# Patient Record
Sex: FEMALE | Race: WHITE
[De-identification: names, ages, dates, MRNs, and addresses within clinical notes are randomized per-mention and may not be internally consistent; named-entity substitution may affect disease eponyms.]

---

## 2017-01-11 ENCOUNTER — HOSPITAL ENCOUNTER (OUTPATIENT)
Dept: HOSPITAL 53 - M OPP | Age: 52
End: 2017-01-11
Attending: SURGERY
Payer: COMMERCIAL

## 2017-01-11 VITALS — HEIGHT: 65 IN | BODY MASS INDEX: 38.32 KG/M2 | WEIGHT: 230 LBS

## 2017-01-11 VITALS — SYSTOLIC BLOOD PRESSURE: 132 MMHG | DIASTOLIC BLOOD PRESSURE: 7 MMHG

## 2017-01-11 DIAGNOSIS — Z87.442: ICD-10-CM

## 2017-01-11 DIAGNOSIS — Z91.041: ICD-10-CM

## 2017-01-11 DIAGNOSIS — K57.30: ICD-10-CM

## 2017-01-11 DIAGNOSIS — D12.3: ICD-10-CM

## 2017-01-11 DIAGNOSIS — R51: ICD-10-CM

## 2017-01-11 DIAGNOSIS — Z57.8: ICD-10-CM

## 2017-01-11 DIAGNOSIS — F32.9: ICD-10-CM

## 2017-01-11 DIAGNOSIS — R06.83: ICD-10-CM

## 2017-01-11 DIAGNOSIS — Z79.899: ICD-10-CM

## 2017-01-11 DIAGNOSIS — Z12.11: Primary | ICD-10-CM

## 2017-01-11 DIAGNOSIS — I10: ICD-10-CM

## 2017-01-11 SDOH — HEALTH STABILITY - PHYSICAL HEALTH: OCCUPATIONAL EXPOSURE TO OTHER RISK FACTORS: Z57.8

## 2017-01-11 NOTE — ROOR
________________________________________________________________________________

Patient Name: Julita Nevarez          Procedure Date: 1/11/2017 9:26 AM

MRN: [id number]                          Account Number: [id number]

YOB: 1965               Age: 52

[place] [facility] [place]                            Gender: Female

Note Status: Finalized                 

________________________________________________________________________________

 

Procedure:           Colonoscopy

Indications:         Screening for colorectal malignant neoplasm

Providers:           DO Addie Huffman MD:        Eliana Lovell MD

Requesting Provider: 

Medicines:           Propofol per Anesthesia

Complications:       No immediate complications.

________________________________________________________________________________

Procedure:           Pre-Anesthesia Assessment:

                     - Prior to the procedure, a History and Physical was 

                     performed, and patient medications and allergies were 

                     reviewed. The patient is competent. The risks and 

                     benefits of the procedure and the sedation options and 

                     risks were discussed with the patient. All questions were 

                     answered and informed consent was obtained. Patient 

                     identification and proposed procedure were verified by 

                     the physician, the nurse, the anesthesiologist and the 

                     technician in the endoscopy suite. Mental Status 

                     Examination: alert and oriented. Airway Examination: 

                     normal oropharyngeal airway and neck mobility. 

                     Respiratory Examination: clear to auscultation. CV 

                     Examination: normal. Prophylactic Antibiotics: The 

                     patient does not require prophylactic antibiotics. Prior 

                     Anticoagulants: The patient has taken no previous 

                     anticoagulant or antiplatelet agents. ASA Grade 

                     Assessment: II - A patient with mild systemic disease. 

                     After reviewing the risks and benefits, the patient was 

                     deemed in satisfactory condition to undergo the 

                     procedure. The anesthesia plan was to use monitored 

                     anesthesia care (MAC). Immediately prior to 

                     administration of medications, the patient was 

                     re-assessed for adequacy to receive sedatives. The heart 

                     rate, respiratory rate, oxygen saturations, blood 

                     pressure, adequacy of pulmonary ventilation, and response 

                     to care were monitored throughout the procedure. The 

                     physical status of the patient was re-assessed after the 

                     procedure.

                     The Colonoscope was introduced through the anus and 

                     advanced to the cecum, identified by the appendiceal 

                     orifice, ileocecal valve and palpation. The colonoscopy 

                     was performed without difficulty. The patient tolerated 

                     the procedure well.

                                                                                

Findings:

     A 5 mm polyp was found in the transverse colon. The polyp was 

     semi-pedunculated. The polyp was removed with a hot snare. Resection was 

     complete, but the polyp tissue was not retrieved. Estimated blood loss: 

     none.

     Scattered small-mouthed diverticula were found in the entire colon.

                                                                                

Impression:          - One 5 mm polyp in the transverse colon, removed with a 

                     hot snare. Complete resection. Polyp tissue not retrieved.

                     - Diverticulosis in the entire examined colon.

Recommendation:      - Patient has a contact number available for emergencies. 

                     The signs and symptoms of potential delayed complications 

                     were discussed with the patient. Return to normal 

                     activities tomorrow. Written discharge instructions were 

                     provided to the patient.

                     - Repeat colonoscopy in 5 years for surveillance.

                     - Return to my office PRN.

                                                                                

 

_________________

Tad Arellano DO

1/11/2017 9:58:05 AM

This report has been signed electronically.

Number of Addenda: 0

 

Note Initiated On: 1/11/2017 9:26 AM

Estimated Blood Loss:

     Estimated blood loss: none.

## 2019-04-09 ENCOUNTER — HOSPITAL ENCOUNTER (OUTPATIENT)
Dept: HOSPITAL 53 - M LABDRAW1 | Age: 54
End: 2019-04-09
Attending: FAMILY MEDICINE
Payer: COMMERCIAL

## 2019-04-09 DIAGNOSIS — E55.9: ICD-10-CM

## 2019-04-09 DIAGNOSIS — E66.9: Primary | ICD-10-CM

## 2019-04-09 LAB
25(OH)D3 SERPL-MCNC: 13 NG/ML (ref 30–100)
BUN SERPL-MCNC: 11 MG/DL (ref 7–18)
CALCIUM SERPL-MCNC: 9 MG/DL (ref 8.5–10.1)
CHLORIDE SERPL-SCNC: 107 MEQ/L (ref 98–107)
CHOLEST SERPL-MCNC: 196 MG/DL (ref ?–200)
CHOLEST/HDLC SERPL: 3.21 {RATIO} (ref ?–5)
CO2 SERPL-SCNC: 30 MEQ/L (ref 21–32)
CREAT SERPL-MCNC: 0.7 MG/DL (ref 0.55–1.3)
GFR SERPL CREATININE-BSD FRML MDRD: > 60 ML/MIN/{1.73_M2} (ref 51–?)
GLUCOSE SERPL-MCNC: 85 MG/DL (ref 70–100)
HDLC SERPL-MCNC: 61 MG/DL (ref 40–?)
LDLC SERPL CALC-MCNC: 120 MG/DL (ref ?–100)
NONHDLC SERPL-MCNC: 135 MG/DL
POTASSIUM SERPL-SCNC: 4.5 MEQ/L (ref 3.5–5.1)
SODIUM SERPL-SCNC: 141 MEQ/L (ref 136–145)
TRIGL SERPL-MCNC: 77 MG/DL (ref ?–150)

## 2019-12-09 ENCOUNTER — HOSPITAL ENCOUNTER (OUTPATIENT)
Dept: HOSPITAL 53 - M LABDRAW1 | Age: 54
End: 2019-12-09
Attending: PHYSICIAN ASSISTANT
Payer: COMMERCIAL

## 2019-12-09 DIAGNOSIS — Z11.59: ICD-10-CM

## 2019-12-09 DIAGNOSIS — I10: Primary | ICD-10-CM

## 2019-12-09 DIAGNOSIS — E55.9: ICD-10-CM

## 2019-12-09 DIAGNOSIS — J39.0: ICD-10-CM

## 2019-12-09 LAB
25(OH)D3 SERPL-MCNC: 48 NG/ML (ref 30–100)
ALBUMIN SERPL BCG-MCNC: 3.5 GM/DL (ref 3.2–5.2)
ALT SERPL W P-5'-P-CCNC: 77 U/L (ref 12–78)
BASOPHILS # BLD AUTO: 0.1 10^3/UL (ref 0–0.2)
BASOPHILS NFR BLD AUTO: 0.9 % (ref 0–1)
BILIRUB SERPL-MCNC: 0.5 MG/DL (ref 0.2–1)
BUN SERPL-MCNC: 12 MG/DL (ref 7–18)
CALCIUM SERPL-MCNC: 8.9 MG/DL (ref 8.5–10.1)
CHLORIDE SERPL-SCNC: 110 MEQ/L (ref 98–107)
CHOLEST SERPL-MCNC: 175 MG/DL (ref ?–200)
CHOLEST/HDLC SERPL: 2.92 {RATIO} (ref ?–5)
CO2 SERPL-SCNC: 30 MEQ/L (ref 21–32)
CREAT SERPL-MCNC: 0.85 MG/DL (ref 0.55–1.3)
EOSINOPHIL # BLD AUTO: 0.1 10^3/UL (ref 0–0.5)
EOSINOPHIL NFR BLD AUTO: 2 % (ref 0–3)
GFR SERPL CREATININE-BSD FRML MDRD: > 60 ML/MIN/{1.73_M2} (ref 51–?)
GLUCOSE SERPL-MCNC: 88 MG/DL (ref 70–100)
HCT VFR BLD AUTO: 49.3 % (ref 36–47)
HCV AB SER QL: 0.1 INDEX (ref ?–0.8)
HDLC SERPL-MCNC: 60 MG/DL (ref 40–?)
HGB BLD-MCNC: 14.7 G/DL (ref 12–15.5)
LDLC SERPL CALC-MCNC: 100 MG/DL (ref ?–100)
LYMPHOCYTES # BLD AUTO: 1.6 10^3/UL (ref 1.5–5)
LYMPHOCYTES NFR BLD AUTO: 27.6 % (ref 24–44)
MCH RBC QN AUTO: 29.8 PG (ref 27–33)
MCHC RBC AUTO-ENTMCNC: 29.8 G/DL (ref 32–36.5)
MCV RBC AUTO: 100 FL (ref 80–96)
MONOCYTES # BLD AUTO: 0.4 10^3/UL (ref 0–0.8)
MONOCYTES NFR BLD AUTO: 7.3 % (ref 0–5)
NEUTROPHILS # BLD AUTO: 3.6 10^3/UL (ref 1.5–8.5)
NEUTROPHILS NFR BLD AUTO: 62 % (ref 36–66)
NONHDLC SERPL-MCNC: 115 MG/DL
PLATELET # BLD AUTO: 161 10^3/UL (ref 150–450)
POTASSIUM SERPL-SCNC: 4.3 MEQ/L (ref 3.5–5.1)
PROT SERPL-MCNC: 6.6 GM/DL (ref 6.4–8.2)
RBC # BLD AUTO: 4.93 10^6/UL (ref 4–5.4)
SODIUM SERPL-SCNC: 144 MEQ/L (ref 136–145)
TRIGL SERPL-MCNC: 75 MG/DL (ref ?–150)
WBC # BLD AUTO: 5.9 10^3/UL (ref 4–10)

## 2019-12-30 ENCOUNTER — HOSPITAL ENCOUNTER (OUTPATIENT)
Dept: HOSPITAL 53 - M LABDRAW1 | Age: 54
End: 2019-12-30
Attending: PHYSICIAN ASSISTANT
Payer: COMMERCIAL

## 2019-12-30 DIAGNOSIS — I26.92: Primary | ICD-10-CM

## 2019-12-30 LAB
BASOPHILS # BLD AUTO: 0.1 10^3/UL (ref 0–0.2)
BASOPHILS NFR BLD AUTO: 0.9 % (ref 0–1)
BUN SERPL-MCNC: 10 MG/DL (ref 7–18)
CALCIUM SERPL-MCNC: 9.1 MG/DL (ref 8.5–10.1)
CHLORIDE SERPL-SCNC: 107 MEQ/L (ref 98–107)
CO2 SERPL-SCNC: 30 MEQ/L (ref 21–32)
CREAT SERPL-MCNC: 0.8 MG/DL (ref 0.55–1.3)
EOSINOPHIL # BLD AUTO: 0.1 10^3/UL (ref 0–0.5)
EOSINOPHIL NFR BLD AUTO: 2.2 % (ref 0–3)
GFR SERPL CREATININE-BSD FRML MDRD: > 60 ML/MIN/{1.73_M2} (ref 51–?)
GLUCOSE SERPL-MCNC: 170 MG/DL (ref 70–100)
HCT VFR BLD AUTO: 48.5 % (ref 36–47)
HGB BLD-MCNC: 14.7 G/DL (ref 12–15.5)
LYMPHOCYTES # BLD AUTO: 1.6 10^3/UL (ref 1.5–5)
LYMPHOCYTES NFR BLD AUTO: 25.5 % (ref 24–44)
MCH RBC QN AUTO: 29.9 PG (ref 27–33)
MCHC RBC AUTO-ENTMCNC: 30.3 G/DL (ref 32–36.5)
MCV RBC AUTO: 98.6 FL (ref 80–96)
MONOCYTES # BLD AUTO: 0.4 10^3/UL (ref 0–0.8)
MONOCYTES NFR BLD AUTO: 6.3 % (ref 0–5)
NEUTROPHILS # BLD AUTO: 4.1 10^3/UL (ref 1.5–8.5)
NEUTROPHILS NFR BLD AUTO: 64.9 % (ref 36–66)
PLATELET # BLD AUTO: 244 10^3/UL (ref 150–450)
POTASSIUM SERPL-SCNC: 4.2 MEQ/L (ref 3.5–5.1)
RBC # BLD AUTO: 4.92 10^6/UL (ref 4–5.4)
SODIUM SERPL-SCNC: 143 MEQ/L (ref 136–145)
WBC # BLD AUTO: 6.4 10^3/UL (ref 4–10)

## 2020-03-03 ENCOUNTER — HOSPITAL ENCOUNTER (OUTPATIENT)
Dept: HOSPITAL 53 - M SLEEP | Age: 55
End: 2020-03-03
Attending: INTERNAL MEDICINE
Payer: COMMERCIAL

## 2020-03-03 DIAGNOSIS — G47.33: Primary | ICD-10-CM

## 2020-03-06 NOTE — SLEEPCENT
DATE OF STUDY:  03/03/2020

 

ORDERED BY:  Dr. Truong

 

Nocturnal polysomnography was performed for evaluation of sleep physiology in

this patient with history of excessive somnolence, morning headaches, and

nonrestorative sleep who has comorbidities of hypertension.

 

8 hours and 14 minutes of data were reviewed.  There were 358 minutes of sleep

identified.  Sleep latency was prolonged 61 minutes.  Rapid eye movement (REM)

latency was prolonged at 146 minutes.  Sleep architecture improved after

interventions were made.  Initial fragmentation was noted, and overall sleep

efficiency was 73.5%.

 

The electrocardiogram showed a sinus rhythm with an average heart rate of 62

beats per minute.  Electroencephalogram (EEG) showed normal waveforms for awake

and sleep.  There were 162 respiratory events identified of 10 seconds in

duration or greater for an apnea-hypopnea index of 27.1.

 

Having clearly established the presence of obstructive sleep apnea syndrome early

in the study testing was stopped shortly before midnight for the application of

pressure therapy.

 

The patient was fit with an ResMed AirFit F30 full-face mask of medium size; 4 cm

of water pressure were applied to the circuit and the lights were extinguished.

Over the remaining hours of testing pressure titration was performed to the

optimal CPAP pressure of +14 with which the patient slept through REM without

respiratory event or oxygen desaturation.  Some activity was noted in the limb

electromyogram (EMG) leads over the course of study but limb movement arousal

index was normal at 1.7.

 

IMPRESSION: Obstructive sleep apnea syndrome (G47.33).  Apnea-hypopnea index

27.1.

 

RECOMMENDATIONS:  Nightly use of pressure therapy at 14 cm of water.

## 2020-08-05 ENCOUNTER — HOSPITAL ENCOUNTER (OUTPATIENT)
Dept: HOSPITAL 53 - M LABSMTC | Age: 55
End: 2020-08-05
Attending: PEDIATRICS
Payer: SELF-PAY

## 2020-08-05 DIAGNOSIS — Z20.828: ICD-10-CM

## 2020-08-05 DIAGNOSIS — Z11.59: Primary | ICD-10-CM

## 2020-08-20 ENCOUNTER — HOSPITAL ENCOUNTER (OUTPATIENT)
Dept: HOSPITAL 53 - M LAB REF | Age: 55
End: 2020-08-20
Attending: PHYSICIAN ASSISTANT
Payer: COMMERCIAL

## 2020-08-20 DIAGNOSIS — R31.9: Primary | ICD-10-CM

## 2020-08-20 LAB
APPEARANCE UR: (no result)
BACTERIA UR QL AUTO: (no result)
BILIRUB UR QL STRIP.AUTO: NEGATIVE
GLUCOSE UR QL STRIP.AUTO: NEGATIVE MG/DL
HGB UR QL STRIP.AUTO: (no result)
KETONES UR QL STRIP.AUTO: NEGATIVE MG/DL
LEUKOCYTE ESTERASE UR QL STRIP.AUTO: NEGATIVE
MUCOUS THREADS URNS QL MICRO: (no result)
NITRITE UR QL STRIP.AUTO: NEGATIVE
PH UR STRIP.AUTO: 5 UNITS (ref 5–9)
PROT UR QL STRIP.AUTO: NEGATIVE MG/DL
RBC # UR AUTO: 60 /HPF (ref 0–3)
SP GR UR STRIP.AUTO: 1.02 (ref 1–1.03)
SQUAMOUS #/AREA URNS AUTO: 2 /HPF (ref 0–6)
UROBILINOGEN UR QL STRIP.AUTO: 0.2 MG/DL (ref 0–2)
WBC #/AREA URNS AUTO: 5 /HPF (ref 0–3)

## 2020-09-09 ENCOUNTER — HOSPITAL ENCOUNTER (OUTPATIENT)
Dept: HOSPITAL 53 - M SMT | Age: 55
End: 2020-09-09
Attending: NURSE PRACTITIONER
Payer: COMMERCIAL

## 2020-09-09 DIAGNOSIS — R31.29: Primary | ICD-10-CM

## 2020-09-09 LAB
APPEARANCE UR: CLEAR
BACTERIA UR QL AUTO: (no result)
BILIRUB UR QL STRIP.AUTO: NEGATIVE
GLUCOSE UR QL STRIP.AUTO: NEGATIVE MG/DL
HGB UR QL STRIP.AUTO: NEGATIVE
KETONES UR QL STRIP.AUTO: NEGATIVE MG/DL
LEUKOCYTE ESTERASE UR QL STRIP.AUTO: NEGATIVE
NITRITE UR QL STRIP.AUTO: NEGATIVE
PH UR STRIP.AUTO: 6 UNITS (ref 5–9)
PROT UR QL STRIP.AUTO: NEGATIVE MG/DL
RBC # UR AUTO: 1 /HPF (ref 0–3)
SP GR UR STRIP.AUTO: 1.01 (ref 1–1.03)
SQUAMOUS #/AREA URNS AUTO: 1 /HPF (ref 0–6)
UROBILINOGEN UR QL STRIP.AUTO: 2 MG/DL (ref 0–2)
WBC #/AREA URNS AUTO: 1 /HPF (ref 0–3)

## 2020-09-16 ENCOUNTER — HOSPITAL ENCOUNTER (OUTPATIENT)
Dept: HOSPITAL 53 - M RAD | Age: 55
End: 2020-09-16
Attending: NURSE PRACTITIONER
Payer: COMMERCIAL

## 2020-09-16 DIAGNOSIS — K57.90: ICD-10-CM

## 2020-09-16 DIAGNOSIS — K42.9: ICD-10-CM

## 2020-09-16 DIAGNOSIS — K76.0: ICD-10-CM

## 2020-09-16 DIAGNOSIS — K80.20: ICD-10-CM

## 2020-09-16 DIAGNOSIS — R31.29: Primary | ICD-10-CM

## 2020-09-16 DIAGNOSIS — N20.0: ICD-10-CM

## 2020-10-01 NOTE — REP
CT WITHOUT CONTRAST: 09/16/20



CLINICAL: Kidney stone.



TECHNIQUE: Axial non-contrast images from the lung bases through the pubic 
symphysis with coronal and sagittal reformations.



FINDINGS:

Diffuse fatty infiltration to the liver noted. Spleen, pancreas, bilateral 
adrenal glands and left kidney are normal. The right kidney includes 9mm non 
obstructing nephrolith. Cholelithiasis noted.



The enteric system is without obstruction or acute inflammatory process. Colonic
diverticula noted without acute diverticulitis. A 7.5cm fat containing 
periumbilical hernia is identified.



The pelvis demonstrates normal bladder and age appropriate uterus/adnexa. 



No ascites. No free air. No adenopathy. Abdominal aorta without aneurysm. 
Musculoskeletal structures demonstrate age related degenerative changes. Lung 
bases are clear.



IMPRESSION:

1.   9mm non obstructing right renal calculus.

2.   Heptaosteatosis.

3.   Colonic diverticulosis without acute diverticulitis. 

4.   Cholelithiasis.

5.   Fat containing periumbilical hernia.

[facility]

## 2020-10-21 ENCOUNTER — HOSPITAL ENCOUNTER (OUTPATIENT)
Dept: HOSPITAL 53 - M LAB | Age: 55
End: 2020-10-21
Attending: UROLOGY
Payer: COMMERCIAL

## 2020-10-21 DIAGNOSIS — N20.0: ICD-10-CM

## 2020-10-21 DIAGNOSIS — Z01.818: Primary | ICD-10-CM

## 2020-10-21 LAB
APPEARANCE UR: (no result)
BACTERIA UR QL AUTO: (no result)
BILIRUB UR QL STRIP.AUTO: NEGATIVE
BUN SERPL-MCNC: 12 MG/DL (ref 7–18)
CALCIUM SERPL-MCNC: 9.4 MG/DL (ref 8.5–10.1)
CHLORIDE SERPL-SCNC: 107 MEQ/L (ref 98–107)
CO2 SERPL-SCNC: 27 MEQ/L (ref 21–32)
CREAT SERPL-MCNC: 0.8 MG/DL (ref 0.55–1.3)
GFR SERPL CREATININE-BSD FRML MDRD: > 60 ML/MIN/{1.73_M2} (ref 51–?)
GLUCOSE SERPL-MCNC: 103 MG/DL (ref 70–100)
GLUCOSE UR QL STRIP.AUTO: NEGATIVE MG/DL
HCT VFR BLD AUTO: 46.3 % (ref 36–47)
HGB BLD-MCNC: 14.5 G/DL (ref 12–15.5)
HGB UR QL STRIP.AUTO: (no result)
INR PPP: 1.55
KETONES UR QL STRIP.AUTO: NEGATIVE MG/DL
LEUKOCYTE ESTERASE UR QL STRIP.AUTO: NEGATIVE
MCH RBC QN AUTO: 29.8 PG (ref 27–33)
MCHC RBC AUTO-ENTMCNC: 31.3 G/DL (ref 32–36.5)
MCV RBC AUTO: 95.1 FL (ref 80–96)
NITRITE UR QL STRIP.AUTO: NEGATIVE
PH UR STRIP.AUTO: 6 UNITS (ref 5–9)
PLATELET # BLD AUTO: 206 10^3/UL (ref 150–450)
POTASSIUM SERPL-SCNC: 4.3 MEQ/L (ref 3.5–5.1)
PROT UR QL STRIP.AUTO: (no result) MG/DL
PROTHROMBIN TIME: 18.9 SECONDS (ref 12.5–14.3)
RBC # BLD AUTO: 4.87 10^6/UL (ref 4–5.4)
RBC # UR AUTO: (no result) /HPF (ref 0–3)
SODIUM SERPL-SCNC: 141 MEQ/L (ref 136–145)
SP GR UR STRIP.AUTO: 1.02 (ref 1–1.03)
SQUAMOUS #/AREA URNS AUTO: 2 /HPF (ref 0–6)
UROBILINOGEN UR QL STRIP.AUTO: 0.2 MG/DL (ref 0–2)
WBC # BLD AUTO: 5.5 10^3/UL (ref 4–10)
WBC #/AREA URNS AUTO: 9 /HPF (ref 0–3)

## 2020-10-21 NOTE — REP
INDICATION:

RENAL CALCULUS Preoperative assessment.



COMPARISON:

None.



TECHNIQUE:

PA and lateral.



FINDINGS:

The mediastinum and cardiac silhouette are normal.  The lung fields are clear and

without acute consolidation, effusion, or pneumothorax.  The skeletal structures are

intact and normal.



IMPRESSION:

No acute cardiopulmonary process.



<Electronically signed by David Zelaya > 10/21/20 1056

## 2020-10-25 ENCOUNTER — HOSPITAL ENCOUNTER (OUTPATIENT)
Dept: HOSPITAL 53 - M LABSMTC | Age: 55
End: 2020-10-25
Attending: ANESTHESIOLOGY
Payer: COMMERCIAL

## 2020-10-25 DIAGNOSIS — Z20.828: ICD-10-CM

## 2020-10-25 DIAGNOSIS — Z01.812: Primary | ICD-10-CM

## 2020-10-30 ENCOUNTER — HOSPITAL ENCOUNTER (OUTPATIENT)
Dept: HOSPITAL 53 - M SDC | Age: 55
Discharge: HOME | End: 2020-10-30
Attending: UROLOGY
Payer: COMMERCIAL

## 2020-10-30 VITALS — HEIGHT: 64 IN | WEIGHT: 288.8 LBS | BODY MASS INDEX: 49.31 KG/M2

## 2020-10-30 VITALS — DIASTOLIC BLOOD PRESSURE: 77 MMHG | SYSTOLIC BLOOD PRESSURE: 140 MMHG

## 2020-10-30 DIAGNOSIS — I10: ICD-10-CM

## 2020-10-30 DIAGNOSIS — N20.0: Primary | ICD-10-CM

## 2020-10-30 DIAGNOSIS — Z86.711: ICD-10-CM

## 2020-10-30 DIAGNOSIS — Z79.899: ICD-10-CM

## 2020-10-30 DIAGNOSIS — Z91.041: ICD-10-CM

## 2020-10-30 DIAGNOSIS — Z79.01: ICD-10-CM

## 2020-10-30 DIAGNOSIS — G47.30: ICD-10-CM

## 2020-10-30 DIAGNOSIS — F32.9: ICD-10-CM

## 2020-10-30 PROCEDURE — 52332 CYSTOSCOPY AND TREATMENT: CPT

## 2020-10-30 PROCEDURE — 74420 UROGRAPHY RTRGR +-KUB: CPT

## 2020-10-30 NOTE — REP
INDICATION:

RIGHT RENAL STONE, DOUBLE J STENT PLACEMENT.



COMPARISON:

None.



TECHNIQUE:

Two views 23 seconds of fluoroscopy time is reported.



FINDINGS:

A sequence of 2 last image hold fluoroscopically obtained spot radiographs of the

abdomen document ureteral cannulation, contrast injection, and double-pigtail stent

placement.  No laterality markers are visible.



IMPRESSION:

Procedural imaging.





<Electronically signed by John Burch > 10/30/20 4520

## 2020-10-30 NOTE — RO
DATE OF OPERATION: October 30, 2020



PRE-PROCEDURE DIAGNOSIS: Right kidney stone.



POST-PROCEDURE DIAGNOSIS: Right kidney stone.



PROCEDURES: 

* Cystoscopy.

* Right ureteroscopy.

* Right retrograde pyelogram with intraoperative interpretative images. 

* Right ureteral stent placement.



SURGEON: Darren Walden MD.  



ASSISTANT: None.



ANESTHESIA: General.



OPERATIVE INDICATIONS: This is a 55-year-old female who was found to have a 6-7 
mm sized stone in the upper pole of the right kidney on preoperative CAT scan. 
She was brought to the operating room today for treatment. 



DESCRIPTION OF PROCEDURE: The patient was brought to the operating room and 
general anesthesia was induced. Prophylactic antibiotics were infused. She was 
placed in the dorsal lithotomy position, prepped, and draped in the usual 
sterile fashion. 



A rigid cystoscope was inserted into the urethral meatus and advanced into the 
bladder. A guidewire was advanced up the right collecting system. I then 
advanced a ureteral access sheath up the right collecting system. I went up the 
ureteral access sheath with the flexible ureteroscope and of note there were no 
stones in the proximal ureter. I then examined all of the calices of the right 
kidney thoroughly and no stones were seen. She did appear to have some 
calcifications embedded into some of the papillae of the upper pole calices. 
Once again, after checking the kidney again, no stones were seen in any of the 
calices. A retrograde pyelogram was then performed and noted for mild right 
hydronephrosis with no extravasation. I then withdrew the ureteroscope along 
with the access sheath and no stones were seen in the ureter either. This 
indicated that her stone had either passed or alternatively, the calcification 
seen on preoperative CAT scan might have been embedded inside of a papilla, 
which would explain why it was not seen inside the collecting system today. At 
this point, the guidewire was utilized to advance a 6-Bulgarian x 22-32 cm JJ 
ureteral stent into the right collecting system. The wire was removed and there 
were adequate curls of the stent in the right renal pelvis and in the bladder. 
The bladder was emptied of all fluid. This marked the conclusion of the 
procedure. 



The patient was then taken out of the dorsal lithotomy position, awakened from 
anesthesia, and transported to the recovery room in stable condition.



ESTIMATED BLOOD LOSS: 5 mL. 



COMPLICATIONS: None.



SPECIMEN: None.  



PLAN: The patient will follow up in the Urology Clinic in 1-2 weeks for stent 
removal.  

 

WAYNE

## 2021-01-06 ENCOUNTER — HOSPITAL ENCOUNTER (OUTPATIENT)
Dept: HOSPITAL 53 - M LAB | Age: 56
End: 2021-01-06
Attending: PHYSICIAN ASSISTANT
Payer: COMMERCIAL

## 2021-01-06 DIAGNOSIS — R73.01: ICD-10-CM

## 2021-01-06 DIAGNOSIS — E55.9: Primary | ICD-10-CM

## 2021-01-06 LAB
ALBUMIN SERPL BCG-MCNC: 3.8 GM/DL (ref 3.2–5.2)
ALT SERPL W P-5'-P-CCNC: 58 U/L (ref 12–78)
BILIRUB SERPL-MCNC: 0.6 MG/DL (ref 0.2–1)
BUN SERPL-MCNC: 12 MG/DL (ref 7–18)
CALCIUM SERPL-MCNC: 9.7 MG/DL (ref 8.5–10.1)
CHLORIDE SERPL-SCNC: 107 MEQ/L (ref 98–107)
CHOLEST SERPL-MCNC: 216 MG/DL (ref ?–200)
CHOLEST/HDLC SERPL: 3.13 {RATIO} (ref ?–5)
CO2 SERPL-SCNC: 31 MEQ/L (ref 21–32)
CREAT SERPL-MCNC: 0.78 MG/DL (ref 0.55–1.3)
EST. AVERAGE GLUCOSE BLD GHB EST-MCNC: 140 MG/DL (ref 60–110)
GFR SERPL CREATININE-BSD FRML MDRD: > 60 ML/MIN/{1.73_M2} (ref 51–?)
GLUCOSE SERPL-MCNC: 123 MG/DL (ref 70–100)
HDLC SERPL-MCNC: 69 MG/DL (ref 40–?)
LDLC SERPL CALC-MCNC: 129 MG/DL (ref ?–100)
NONHDLC SERPL-MCNC: 147 MG/DL
POTASSIUM SERPL-SCNC: 4.6 MEQ/L (ref 3.5–5.1)
PROT SERPL-MCNC: 7.1 GM/DL (ref 6.4–8.2)
SODIUM SERPL-SCNC: 142 MEQ/L (ref 136–145)
TRIGL SERPL-MCNC: 91 MG/DL (ref ?–150)

## 2021-04-06 ENCOUNTER — HOSPITAL ENCOUNTER (OUTPATIENT)
Dept: HOSPITAL 53 - M SMT | Age: 56
End: 2021-04-06
Attending: NURSE PRACTITIONER
Payer: COMMERCIAL

## 2021-04-06 DIAGNOSIS — R31.0: Primary | ICD-10-CM

## 2021-04-06 LAB
APPEARANCE UR: (no result)
BACTERIA UR QL AUTO: NEGATIVE
BILIRUB UR QL STRIP.AUTO: NEGATIVE
GLUCOSE UR QL STRIP.AUTO: NEGATIVE MG/DL
HGB UR QL STRIP.AUTO: (no result)
KETONES UR QL STRIP.AUTO: NEGATIVE MG/DL
LEUKOCYTE ESTERASE UR QL STRIP.AUTO: (no result)
NITRITE UR QL STRIP.AUTO: NEGATIVE
PH UR STRIP.AUTO: 5 UNITS (ref 5–9)
PROT UR QL STRIP.AUTO: (no result) MG/DL
RBC # UR AUTO: (no result) /HPF (ref 0–3)
SP GR UR STRIP.AUTO: 1.02 (ref 1–1.03)
SQUAMOUS #/AREA URNS AUTO: 1 /HPF (ref 0–6)
UROBILINOGEN UR QL STRIP.AUTO: 0.2 MG/DL (ref 0–2)
WBC #/AREA URNS AUTO: 7 /HPF (ref 0–3)

## 2021-04-19 ENCOUNTER — HOSPITAL ENCOUNTER (OUTPATIENT)
Dept: HOSPITAL 53 - M CARPUL | Age: 56
End: 2021-04-19
Attending: INTERNAL MEDICINE
Payer: COMMERCIAL

## 2021-04-19 DIAGNOSIS — I27.29: Primary | ICD-10-CM

## 2021-04-20 NOTE — ECHO
DATE OF PROCEDURE: 04/19/2021



Age: 56 

Gender: Female  

Height: 162 cm  

Weight: 129 kg  



REFERRING PHYSICIAN: Chico Truong M.D.  



INDICATION: Pulmonary hypertension.  



MEASUREMENTS:

   IVS 1.3 cm

   LV 3.7 cm

   LVPW 1.3 cm

   LA 3.8 cm

   Aorta 3.1 cm

   Mitral E wave velocity 55 cm/s 

   Mitral A wave 68 cm/s 

   E prime septal 6.0 cm/s 

   E prime lateral 8.6 cm/s 

 

FINDINGS: 

This study is of limited technical quality with very difficult visualization 
corresponding to the patients body habitus. Underlying sinus rhythm.



Left ventricle is normal size. Mild left ventricular hypertrophy is noted. 
Overall preserved LV systolic function with EF around 55% to 60%. Right 
ventricle does not appear grossly dilated based on limited views. Both atria 
appear normal. Aortic valve is minimally sclerotic, but has three cusps and 
preserved mobility. Mitral valve was poorly visualized. Based on parasternal 
views, it appears there might be some myxomatous degeneration, but the quality 
of the views was poor. Mobility is preserved. Tricuspid valve appears normal. 
Pulmonic valve was not seen. No pericardial effusion is noted. Inferior vena 
cava is relatively small in caliber and completely collapses with inspiration 
indicative of normal central venous pressure. The aortic root is normal. Aortic 
arch and abdominal aorta also appear normal.



Doppler interrogation reveals competent aortic valve. There is trace mitral and 
trace tricuspid insufficiency. Calculated pulmonary artery pressure is in the 
low 30s corresponding to mild pulmonary hypertension.



Mitral inflow pattern and tissue Doppler imaging of the mitral annulus revealed 
grade 1 diastolic dysfunction.



CONCLUSIONS: 

1.  Study is of very limited technical quality corresponding to patients body 
habitus. Underlying sinus rhythm. 

2.  Normal LV size with mild LVH and preserved LV systolic function. Grade 1 
diastolic dysfunction. 

3.  No hemodynamically significant valvular disease. 

4.  Normal central venous pressure and likely mild pulmonary hypertension with 
estimated pulmonary artery pressure 30 to 35 mmHg. Right ventricle was 
relatively poorly visualized, but does not appear grossly enlarged. 

MTDD

## 2021-04-26 ENCOUNTER — HOSPITAL ENCOUNTER (OUTPATIENT)
Dept: HOSPITAL 53 - M RAD | Age: 56
End: 2021-04-26
Attending: NURSE PRACTITIONER
Payer: COMMERCIAL

## 2021-04-26 DIAGNOSIS — R16.0: ICD-10-CM

## 2021-04-26 DIAGNOSIS — N20.0: ICD-10-CM

## 2021-04-26 DIAGNOSIS — K57.50: ICD-10-CM

## 2021-04-26 DIAGNOSIS — K76.0: ICD-10-CM

## 2021-04-26 DIAGNOSIS — K42.9: ICD-10-CM

## 2021-04-26 DIAGNOSIS — R31.0: Primary | ICD-10-CM

## 2021-04-26 NOTE — REP
INDICATION:

KIDNEY STONES



COMPARISON:

09/16/2020.



TECHNIQUE:

CT Scan of the abdomen and pelvis was performed without intravenous contrast. Sagittal

and coronal reconstruction images performed.



FINDINGS:

Lung bases: Mild fibro atelectatic changes bilaterally.

Liver:  There is diffuse fatty infiltration of the liver with hepatomegaly, the length

of the liver is approximately 20.5 cm.

Gallbladder:  Unremarkable.

Spleen:  Grossly unremarkable..

Adrenals:  Normal.

Pancreas:  Grossly unremarkable..

Kidneys:  A calculus in the lower pole of the right renal collecting system has a

maximum diameter of 1 cm.  No other renal calculi are seen bilaterally and there is no

hydroureteronephrosis.  No ureteral calculus is seen bilaterally.  No bladder calculus

is seen.

Small and large bowel: There is colonic diverticulosis without acute diverticulitis.

Free fluid:  None.

Abdominal aorta: No aneurysm.

Adenopathy:  None.

Appendix: Not inflamed.

Osseous structures:  Unremarkable.

Pelvis:  No mass.  Moderate umbilical hernia contains noninflamed fat similar to the

prior study.



IMPRESSION:

Diffuse fatty infiltration of the liver with hepatomegaly.  Intrarenal calculus lower

pole right kidney 1 cm in diameter with no other evidence of renal, ureteral or

bladder calculus and no hydroureteronephrosis.  Stable umbilical hernia.





<Electronically signed by Tad Guerrero > 04/26/21 0716

## 2021-05-28 ENCOUNTER — HOSPITAL ENCOUNTER (OUTPATIENT)
Dept: HOSPITAL 53 - M CARPUL | Age: 56
End: 2021-05-28
Attending: INTERNAL MEDICINE
Payer: COMMERCIAL

## 2021-05-28 DIAGNOSIS — R06.02: Primary | ICD-10-CM

## 2021-05-28 NOTE — PFTRPT
Height: 65.00  Inches  Weight: 278.00  Lbs  BSA: 2.28

Diagnosis: R06.02



DATE: 05/28/2021



ORDERED BY: Chico Truong M.D. 

 

QUALITY: Study of excellent technical quality. 



PROCEDURE: Under protocol, methacholine was administered. At a dose of 10 mg or 
63.875 CDUs, a 27% decline in the FEV1 was noted. PC of 3.86 is significant. 
Flow rates did return to baseline post bronchodilator administration. 

 

IMPRESSION: Positive methacholine challenge study.

MTDD

## 2021-07-06 ENCOUNTER — HOSPITAL ENCOUNTER (OUTPATIENT)
Dept: HOSPITAL 53 - M LAB | Age: 56
End: 2021-07-06
Attending: UROLOGY
Payer: COMMERCIAL

## 2021-07-06 DIAGNOSIS — Z01.818: Primary | ICD-10-CM

## 2021-07-06 DIAGNOSIS — R31.0: ICD-10-CM

## 2021-07-06 LAB
BUN SERPL-MCNC: 13 MG/DL (ref 7–18)
CALCIUM SERPL-MCNC: 9.8 MG/DL (ref 8.5–10.1)
CHLORIDE SERPL-SCNC: 107 MEQ/L (ref 98–107)
CO2 SERPL-SCNC: 28 MEQ/L (ref 21–32)
CREAT SERPL-MCNC: 0.81 MG/DL (ref 0.55–1.3)
GFR SERPL CREATININE-BSD FRML MDRD: > 60 ML/MIN/{1.73_M2} (ref 51–?)
GLUCOSE SERPL-MCNC: 152 MG/DL (ref 70–100)
HCT VFR BLD AUTO: 44.6 % (ref 36–47)
HGB BLD-MCNC: 14.1 G/DL (ref 12–15.5)
MCH RBC QN AUTO: 30.5 PG (ref 27–33)
MCHC RBC AUTO-ENTMCNC: 31.6 G/DL (ref 32–36.5)
MCV RBC AUTO: 96.3 FL (ref 80–96)
PLATELET # BLD AUTO: 218 10^3/UL (ref 150–450)
POTASSIUM SERPL-SCNC: 3.9 MEQ/L (ref 3.5–5.1)
RBC # BLD AUTO: 4.63 10^6/UL (ref 4–5.4)
SODIUM SERPL-SCNC: 141 MEQ/L (ref 136–145)
WBC # BLD AUTO: 8.2 10^3/UL (ref 4–10)

## 2021-07-10 ENCOUNTER — HOSPITAL ENCOUNTER (OUTPATIENT)
Dept: HOSPITAL 53 - M LAB | Age: 56
End: 2021-07-10
Attending: FAMILY MEDICINE
Payer: COMMERCIAL

## 2021-07-10 DIAGNOSIS — R73.03: Primary | ICD-10-CM

## 2021-07-10 LAB — EST. AVERAGE GLUCOSE BLD GHB EST-MCNC: 128 MG/DL (ref 60–110)

## 2021-07-12 ENCOUNTER — HOSPITAL ENCOUNTER (OUTPATIENT)
Dept: HOSPITAL 53 - M LAB | Age: 56
End: 2021-07-12
Attending: UROLOGY
Payer: COMMERCIAL

## 2021-07-12 DIAGNOSIS — R31.0: Primary | ICD-10-CM

## 2021-07-12 LAB
APPEARANCE UR: (no result)
BACTERIA UR QL AUTO: NEGATIVE
BILIRUB UR QL STRIP.AUTO: NEGATIVE
GLUCOSE UR QL STRIP.AUTO: NEGATIVE MG/DL
HGB UR QL STRIP.AUTO: (no result)
KETONES UR QL STRIP.AUTO: NEGATIVE MG/DL
LEUKOCYTE ESTERASE UR QL STRIP.AUTO: (no result)
MUCOUS THREADS URNS QL MICRO: (no result)
NITRITE UR QL STRIP.AUTO: NEGATIVE
PH UR STRIP.AUTO: 5 UNITS (ref 5–9)
PROT UR QL STRIP.AUTO: (no result) MG/DL
RBC # UR AUTO: 162 /HPF (ref 0–3)
SP GR UR STRIP.AUTO: 1.03 (ref 1–1.03)
SQUAMOUS #/AREA URNS AUTO: 1 /HPF (ref 0–6)
UROBILINOGEN UR QL STRIP.AUTO: 0.2 MG/DL (ref 0–2)
WBC #/AREA URNS AUTO: 7 /HPF (ref 0–3)

## 2021-07-14 ENCOUNTER — HOSPITAL ENCOUNTER (OUTPATIENT)
Dept: HOSPITAL 53 - M LABSMTC | Age: 56
End: 2021-07-14
Attending: ANESTHESIOLOGY
Payer: COMMERCIAL

## 2021-07-14 DIAGNOSIS — Z01.812: Primary | ICD-10-CM

## 2021-07-14 DIAGNOSIS — Z20.822: ICD-10-CM

## 2021-07-19 ENCOUNTER — HOSPITAL ENCOUNTER (OUTPATIENT)
Dept: HOSPITAL 53 - M SDC | Age: 56
Discharge: HOME | End: 2021-07-19
Attending: UROLOGY
Payer: COMMERCIAL

## 2021-07-19 VITALS — SYSTOLIC BLOOD PRESSURE: 140 MMHG | DIASTOLIC BLOOD PRESSURE: 78 MMHG

## 2021-07-19 VITALS — WEIGHT: 272 LBS | HEIGHT: 64 IN | BODY MASS INDEX: 46.44 KG/M2

## 2021-07-19 DIAGNOSIS — F32.9: ICD-10-CM

## 2021-07-19 DIAGNOSIS — Z79.899: ICD-10-CM

## 2021-07-19 DIAGNOSIS — G47.30: ICD-10-CM

## 2021-07-19 DIAGNOSIS — I10: ICD-10-CM

## 2021-07-19 DIAGNOSIS — R31.0: Primary | ICD-10-CM

## 2021-07-19 DIAGNOSIS — J45.909: ICD-10-CM

## 2021-07-19 DIAGNOSIS — Z91.041: ICD-10-CM

## 2021-07-19 DIAGNOSIS — Z86.711: ICD-10-CM

## 2021-07-19 PROCEDURE — 52332 CYSTOSCOPY AND TREATMENT: CPT

## 2021-07-19 PROCEDURE — 74420 UROGRAPHY RTRGR +-KUB: CPT

## 2021-07-19 NOTE — RO
OPERATIVE NOTE



DATE OF OPERATION: 07/19/2021



PRE-OPERATIVE DIAGNOSIS: Gross hematuria.  



POST-OPERATIVE DIAGNOSIS: Gross hematuria. 



PROCEDURES:

1.  Cystoscopy.

2.  Bilateral ureteroscopy. 

3.  Bilateral retrograde pyelograms with intraoperative images.

4.  Left ureteral stent placement. 



SURGEON: Darren Walden MD.  



ASSISTANT: None.



ANESTHESIA: General.



OPERATIVE INDICATIONS: This is a 56-year-old female with persistent gross

hematuria. We were not able to identify a cause on office cystoscopy or CAT

scan. She therefore, was brought to the operating room today to better evaluate

her upper urinary tracts. 



DESCRIPTION OF PROCEDURE: The patient was brought to the operating room and

general anesthesia was induced. Prophylactic antibiotics were infused. She was

placed in the dorsal lithotomy position, prepped, and draped in the usual

sterile fashion. 



A rigid cystoscope was inserted into the urethral meatus and advanced to the

bladder. The bladder was thoroughly examined. The bladder did appear to be

hypervascular. Bilateral ureteral orifices were orthotopic and both effluxed

clear yellow urine with a small amount of sediment coming out. At this point, a

guidewire was advanced up the left collecting system. I advanced a ureteral

access sheath up the left collecting system. I went up the access sheath with a

flexible ureteroscope and then examined the left kidney thoroughly. There was

mild nephrocalcinosis, but no large stones were seen. There were no lesions

seen inside any of the calices. A retrograde pyelogram was performed and

notable for mild left hydronephrosis with no extravasation. There were no

filling defects. I then withdrew the ureteroscope along with the access sheath

and no abnormalities were seen inside the ureter. I then utilized the guidewire

to advance a 6-Spanish x 22-32 cm JJ ureteral stent up the left collecting

system. The wire was removed and there were adequate curls of the stent in the

left renal pelvis and in the bladder. I then advanced the guidewire up the

right collecting system. I went directly up the wire with a flexible

ureteroscope and into the kidney. I then examined the right kidney thoroughly.

It also was notable for mild nephrocalcinosis with just one tiny stone fragment

seen. I examined all of the calices and not lesions were seen in any of the

calices. A retrograde pyelogram was performed and was notable for mild right

hydronephrosis with no extravasation. There were no filling defects. I then

withdrew the ureteroscope and there were no abnormalities seen inside the right

ureter. At this point, I traded back out the cystoscope and I observed the

right ureteral orifice. It effluxed very well all of the contrast fluid out of

the right kidney without any difficulty. I therefore, decided not to place a

stent on the right side. At this point, the bladder was emptied of all fluids

and this marked the conclusion of the procedure. 



The patient was taken out of the dorsal lithotomy position, awakened from

anesthesia, and transported to the recovery room in stable condition.



ESTIMATED BLOOD LOSS: 10 mL. 



COMPLICATIONS: None.



SPECIMEN: None. 



PLAN: The patient will follow up in the Urology Clinic in a few weeks for stent

removal. Of note, I suspect the patient's gross hematuria is related to mild

urothelial inflammation in conjunction with her being on a blood thinner. As

long as she does not have significant hematuria causing clotting and her

hemoglobin remains stable, we will probably not work this up any further unless

it persists for several years.

WAYNE

## 2021-07-19 NOTE — REP
INDICATION:

BILATERAL STENT PLACEMENT.



COMPARISON:

10/30/2020.



TECHNIQUE:

Three C-arm views abdomen and pelvis.



FINDINGS:

Contrast partially opacifies the bilateral pelvocaliceal systems.  A left ureteral

stent is placed, the proximal end is in the left renal pelvis.  The distal end is not

visualized.



IMPRESSION:

22 seconds fluoroscopy time utilized.





<Electronically signed by Tad Guerrero > 07/19/21 0861

## 2022-07-12 ENCOUNTER — HOSPITAL ENCOUNTER (OUTPATIENT)
Dept: HOSPITAL 53 - M LAB | Age: 57
End: 2022-07-12
Attending: NURSE PRACTITIONER
Payer: COMMERCIAL

## 2022-07-12 DIAGNOSIS — Z79.01: ICD-10-CM

## 2022-07-12 DIAGNOSIS — I10: ICD-10-CM

## 2022-07-12 DIAGNOSIS — R73.03: Primary | ICD-10-CM

## 2022-07-12 LAB
ALBUMIN SERPL BCG-MCNC: 3.9 GM/DL (ref 3.2–5.2)
ALT SERPL W P-5'-P-CCNC: 42 U/L (ref 12–78)
BASOPHILS # BLD AUTO: 0.1 10^3/UL (ref 0–0.2)
BASOPHILS NFR BLD AUTO: 1 % (ref 0–1)
BILIRUB SERPL-MCNC: 0.5 MG/DL (ref 0.2–1)
BUN SERPL-MCNC: 13 MG/DL (ref 7–18)
CALCIUM SERPL-MCNC: 9.5 MG/DL (ref 8.5–10.1)
CHLORIDE SERPL-SCNC: 107 MEQ/L (ref 98–107)
CO2 SERPL-SCNC: 29 MEQ/L (ref 21–32)
CREAT SERPL-MCNC: 0.86 MG/DL (ref 0.55–1.3)
EOSINOPHIL # BLD AUTO: 0.2 10^3/UL (ref 0–0.5)
EOSINOPHIL NFR BLD AUTO: 2.3 % (ref 0–3)
EST. AVERAGE GLUCOSE BLD GHB EST-MCNC: 128 MG/DL (ref 60–110)
GFR SERPL CREATININE-BSD FRML MDRD: > 60 ML/MIN/{1.73_M2} (ref 51–?)
GLUCOSE SERPL-MCNC: 105 MG/DL (ref 70–100)
HCT VFR BLD AUTO: 44.5 % (ref 36–47)
HGB BLD-MCNC: 14.1 G/DL (ref 12–15.5)
LYMPHOCYTES # BLD AUTO: 2.2 10^3/UL (ref 1.5–5)
LYMPHOCYTES NFR BLD AUTO: 30.9 % (ref 24–44)
MCH RBC QN AUTO: 30.2 PG (ref 27–33)
MCHC RBC AUTO-ENTMCNC: 31.7 G/DL (ref 32–36.5)
MCV RBC AUTO: 95.3 FL (ref 80–96)
MONOCYTES # BLD AUTO: 0.6 10^3/UL (ref 0–0.8)
MONOCYTES NFR BLD AUTO: 7.9 % (ref 2–8)
NEUTROPHILS # BLD AUTO: 4.1 10^3/UL (ref 1.5–8.5)
NEUTROPHILS NFR BLD AUTO: 57.5 % (ref 36–66)
PLATELET # BLD AUTO: 221 10^3/UL (ref 150–450)
POTASSIUM SERPL-SCNC: 4.3 MEQ/L (ref 3.5–5.1)
PROT SERPL-MCNC: 7 GM/DL (ref 6.4–8.2)
RBC # BLD AUTO: 4.67 10^6/UL (ref 4–5.4)
SODIUM SERPL-SCNC: 140 MEQ/L (ref 136–145)
WBC # BLD AUTO: 7.1 10^3/UL (ref 4–10)